# Patient Record
Sex: FEMALE | Race: WHITE | NOT HISPANIC OR LATINO | Employment: UNEMPLOYED | URBAN - METROPOLITAN AREA
[De-identification: names, ages, dates, MRNs, and addresses within clinical notes are randomized per-mention and may not be internally consistent; named-entity substitution may affect disease eponyms.]

---

## 2018-02-05 ENCOUNTER — HOSPITAL ENCOUNTER (OUTPATIENT)
Dept: PHYSICAL THERAPY | Facility: HOSPITAL | Age: 43
Setting detail: THERAPIES SERIES
Discharge: HOME | End: 2018-02-05

## 2018-02-05 DIAGNOSIS — M54.5 LOW BACK PAIN, UNSPECIFIED BACK PAIN LATERALITY, UNSPECIFIED CHRONICITY, WITH SCIATICA PRESENCE UNSPECIFIED: ICD-10-CM

## 2018-02-05 DIAGNOSIS — M54.2 CERVICALGIA: Primary | ICD-10-CM

## 2018-02-05 PROCEDURE — 97161 PT EVAL LOW COMPLEX 20 MIN: CPT | Performed by: PHYSICAL THERAPIST

## 2018-02-05 NOTE — THERAPY EVALUATION
Outpatient Physical Therapy Ortho Initial Evaluation   [place]     Patient Name: Helena Herrmann  : 1975  MRN: [id number]  Today's Date: 2018      Visit Date: 2018    There is no problem list on file for this patient.       Past Medical History:   Diagnosis Date   • Scoliosis    • Spinal stenosis         Past Surgical History:   Procedure Laterality Date   • CHOLECYSTECTOMY         Visit Dx:     ICD-10-CM ICD-9-CM   1. Cervicalgia M54.2 723.1   2. Low back pain, unspecified back pain laterality, unspecified chronicity, with sciatica presence unspecified M54.5 724.2             Patient History       18 0600          History    Chief Complaint Difficulty with daily activities;Pain  -GC      Type of Pain Back pain;Neck pain;Upper Extremity / Arm  -GC      Date Current Problem(s) Began --   Pt reports years of neck and back pain  -      Brief Description of Current Complaint Pt c/o years of neck and back pain. She has had previous therapy and has used a chiropractor as well. She states her pain is constant and she has experienced a recent increase inher back pain withporlonged standing at work and she states she has started to experience a cold sensation and pain in her UEs to her elbows. She was seen by JOHNNA Lopez who has referred her to therapy.  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 X-ray;MRI  -      Pain     Pain Location Back;Neck  -      Pain at Present 9  -GC      Pain at Best 9  -GC      Pain at Worst 9  -GC      Pain Frequency Constant/continuous  -GC      Pain Description Aching;Discomfort;Tightness;Radiating;Other (Comment)   cool sensation  -      What Performance Factors Make the Current Problem(s)  WORSE? Pt c/o back pain with being on her feet for long periods. She states that any activity that involves use of her arms or turningher head increase her neck pain  -GC      What Performance Factors Make the Current Problem(s) BETTER? Pt feels better if she is able to lie down and rest  -GC      Difficulties at work? Pt has difficulty being on her feet for long periods at work  -GC      Difficulties with ADL's? Pt has pain with light household chores  -GC      Daily Activities    Primary Language English  -GC      Are you able to read Yes  -GC      Are you able to write Yes  -GC      How does patient learn best? Reading;Other (comment)   visual  -GC      Teaching needs identified Home Exercise Program;Management of Condition  -GC      Patient is concerned about/has problems with Flexibility;Performing home management (household chores, shopping, care of dependents);Performing job responsibilities/community activities (work, school,;Performing sports, recreation, and play activities;Standing;Walking  -GC      Does patient have problems with the following? None  -GC      Barriers to learning None  -GC      Pt Participated in POC and Goals Yes  -GC      Safety    Are you being hurt, hit, or frightened by anyone at home or in your life? No  -GC      Are you being neglected by a caregiver No  -GC        User Key  (r) = Recorded By, (t) = Taken By, (c) = Cosigned By    Initials Name Provider Type    GC Best Greenfield PT Physical Therapist                PT Ortho       02/05/18 0600    Posture/Observations    Forward Head Mild  -GC    Cervical Lordosis Decreased  -GC    Thoracic Kyphosis Normal  -GC    Rounded Shoulders Bilateral:;Mild  -GC    Scapular Elevation Bilateral:;Normal  -GC    Scapular winging Bilateral:;Normal  -GC    Scoliosis Mild  -GC    Lumbar lordosis Decreased  -GC    Iliac crests Bilateral:;Normal  -GC    DTR- Upper Quarter Clearing    Biceps (C5/6) Bilateral:;2- Normal response  -GC    Brachioradialis  (C6) Bilateral:;2- Normal response  -GC    Triceps (C7) Bilateral:;2- Normal response  -GC    Sensory Screen for Light Touch- Upper Quarter Clearing    C4 (posterior shoulder) Bilateral:;Intact  -GC    C5 (lateral upper arm) Bilateral:;Intact  -GC    C6 (tip of thumb) Bilateral:;Intact  -GC    C7 (tip of 3rd finger) Bilateral:;Intact  -GC    C8 (tip of 5th finger) Bilateral:;Intact  -GC    T1 (medial lower arm) Bilateral:;Intact  -GC    Myotomal Screen- Upper Quarter Clearing    Shoulder flexion (C5) Bilateral:;5 (Normal)  -GC    Elbow flexion/wrist extension (C6) Bilateral:;5 (Normal)  -GC    Elbow extension/wrist flexion (C7) Bilateral:;5 (Normal)  -GC    Finger flexion/ (C8) Bilateral:;5 (Normal)  -GC    Finger abduction (T1) Bilateral:;5 (Normal)  -GC    DTR- Lower Quarter Clearing    Patellar tendon (L2-4) Bilateral:;2- Normal response  -GC    Achilles tendon (S1-2) Bilateral:;2- Normal response  -GC    Sensory Screen for Light Touch- Lower Quarter Clearing    L2 (anterior mid thigh) Bilateral:;Intact  -GC    L3 (distal anterior thigh) Bilateral:;Intact  -GC    L4 (medial lower leg/foot) Bilateral:;Intact  -GC    L5 (lateral lower leg/great toe) Bilateral:;Intact  -GC    S1 (bottom of foot) Bilateral:;Intact  -GC    Myotomal Screen- Lower Quarter Clearing    Hip flexion (L2) Bilateral:;5 (Normal)  -GC    Knee extension (L3) Bilateral:;5 (Normal)  -GC    Ankle DF (L4) Bilateral:;5 (Normal)  -GC    Great toe extension (L5) Bilateral:;5 (Normal)  -GC    Ankle PF (S1) Bilateral:;5 (Normal)  -GC    Knee flexion (S2) Bilateral:;5 (Normal)  -GC    Cervical Palpation    Levator Scapula Bilateral:;Tender;Guarded/taut  -GC    Upper Traps Bilateral:;Tender;Guarded/taut  -GC    Cervical/Thoracic Special Tests    Cervical Compression (Forarminal Compression vs. Facet Pain) Negative  -GC    Cervical Distraction (Foraminal Compression vs. Facet Pain) Negative  -GC    Sharp-Marianna (AA instability) Negative  -GC     Vertebral Artery Test (VBI Sign) Bilateral:;Negative  -GC    Lumbosacral Palpation    SI Right:;Tender   right on left posterior sacral torsion   -GC    Lumbosacral Segment Tender   FRS left at L5/S1  -GC    Erector Spinae (Paraspinals) Bilateral:;Tender;Guarded/taut  -GC    Lumbar/SI Special Tests    Stork Test (SI Dysfunction) Right:;Positive  -GC    SLR (Neural Tension) Bilateral:;Negative  -GC    ANOOP (hip vs. SI Dysfunction) Bilateral:;Positive  -GC    Neck    Flexion AROM Deficit 75% range  -GC    Extension AROM Deficit 50% range  -GC    Lt Lat Flexion AROM Deficit 75% range  -GC    Rt Lateral Flexion AROM Deficit 75% range  -GC    Lt Rotation AROM Deficit 75% range  -GC    Rt Rotation AROM Deficit 75% range  -GC    Trunk    Flexion AROM Deficit 50% range  -GC    Extension AROM Deficit 50% range wiht pain  -GC    Left Lateral Flexion AROM within functional limits  -GC    Right Lateral Flexion AROM Deficit 50% range with pain  -GC    Left Rotation AROM within functional limits  -GC    Right Rotation AROM Deficit 50% range with pain  -GC    Trunk    Trunk Flexion Gross Movement (4/5) good  -GC    Trunk Extension Gross Movement (4/5) good  -GC    Upper Extremity Flexibility    Upper Trapezius Bilateral:;Moderately limited  -GC    Levator Scapula Bilateral:;Moderately limited  -GC    Lower Extremity Flexibility    Hamstrings Bilateral:;Moderately limited  -GC    Hip External Rotators Bilateral:;Mildly limited  -GC    Hip Internal Rotators Bilateral:;Moderately limited  -GC    Transfers    Transfer, Comment Pt is independent with all bed mobility and transfers  -      User Key  (r) = Recorded By, (t) = Taken By, (c) = Cosigned By    Initials Name Provider Type    GC Best Greenfield PT Physical Therapist                      Therapy Education  Given: HEP, Symptoms/condition management, Pain management, Posture/body mechanics  Program: New  How Provided: Verbal, Demonstration, Written  Provided to: Patient  Level  of Understanding: Teach back education performed, Verbalized, Demonstrated           PT OP Goals       02/05/18 0600       PT Short Term Goals    STG Date to Achieve 02/19/18  -     STG 1 Decrease pain to 4-5/10 with activity.  -GC     STG 2 Increase cervical AROM to WFL all planes with testing.  -GC     STG 3 Increase trunk AROM to WFL all planes with testing.  -GC     STG 4 Increase hamstring and piriformis flexibility to only a minimal restricition with testing.  -GC     STG 5 Increase upper trapezius flexibility to only a minimal restriciton with testing.  -GC     STG 6 Pt will be independent with her HEP issued by this therapist.  -GC     Long Term Goals    LTG Date to Achieve 03/05/18  -GC     LTG 1 Decrease pain to 1-2/10 with activity.  -GC     LTG 2 Increase hamstring and piriformis flexibility to WFL with testing.  -GC     LTG 3 Increase upper trapezius flexibility to WFL with testing.  -     LTG 4 Pt will be independent with all ADLs without increased pain.  -     Time Calculation    PT Goal Re-Cert Due Date 03/05/18  -       User Key  (r) = Recorded By, (t) = Taken By, (c) = Cosigned By    Initials Name Provider Type    GC Best Greenfield, PT Physical Therapist                PT Assessment/Plan       02/05/18 0600       PT Assessment    Functional Limitations Limitations in community activities;Limitations in functional capacity and performance;Performance in work activities;Performance in leisure activities;Performance in self-care ADL  -     Impairments Range of motion;Pain;Muscle strength;Impaired flexibility  -     Assessment Comments Pt presents with a long history of neck and back pain and also c/o some raducular pain in her UEs. She rates her painas a 9/10 and that it is consistent no matter what position or activity she does. She has decreased cervcial and lumbar pain, decreased flexibility, decreased core strength, and decreased function secondary to the above.  -     Please refer to  paper survey for additional self-reported information Yes  -GC     Rehab Potential Fair  -GC     Patient/caregiver participated in establishment of treatment plan and goals Yes  -GC     Patient would benefit from skilled therapy intervention Yes  -GC     PT Plan    PT Frequency 2x/week  -GC     Predicted Duration of Therapy Intervention (days/wks) 4 weeks  -GC     Planned CPT's? PT EVAL LOW COMPLEXITY: 00862;PT THER PROC EA 15 MIN: 97121;PT MANUAL THERAPY EA 15 MIN: 04032;PT HOT OR COLD PACK TREAT MCARE;PT ELECTRICAL STIM UNATTEND: ;PT ULTRASOUND EA 15 MIN: 27945;PT TRACTION CERVICAL: 50319  -GC     PT Plan Comments Pt is to continue her HEP 2x daily  -GC       User Key  (r) = Recorded By, (t) = Taken By, (c) = Cosigned By    Initials Name Provider Type    MARTIN Greenfield, PT Physical Therapist                Modalities       02/05/18 0600          Traction 56025    Traction Type Cervical  -GC      Rx Minutes 15  -GC      Position Hook-lying  -GC      Weight 14  -GC      Hold 60  -GC      Relax 20  -GC        User Key  (r) = Recorded By, (t) = Taken By, (c) = Cosigned By    Initials Name Provider Type    MARTIN Greenfield, PT Physical Therapist              Exercises       02/05/18 0600          Exercise 1    Exercise Name 1 Hamstring stretch with ADD-bilateral  -GC      Cueing 1 Verbal;Tactile  -GC      Reps 1 10  -GC      Time (Seconds) 1 10 secsa  -GC      Exercise 2    Exercise Name 2 Piriformis stretch-bilateral  -GC      Cueing 2 Verbal;Tactile  -GC      Reps 2 10  -GC      Time (Seconds) 2 10 secs  -GC      Exercise 3    Exercise Name 3 MET for FRS left at L5/S1 and right on left posterior sacral torsion  -GC      Time (Minutes) 3 2 min  -GC      Exercise 4    Exercise Name 4 Unilateral press up on right   -GC      Cueing 4 Verbal;Tactile  -GC      Reps 4 20  -GC        User Key  (r) = Recorded By, (t) = Taken By, (c) = Cosigned By    Initials Name Provider Type    MARTIN Greenfield, PT Physical Therapist                                   Time Calculation:   Start Time: 0600  Stop Time: 0704  Time Calculation (min): 64 min     Therapy Charges for Today     Code Description Service Date Service Provider Modifiers Qty    91877355690 HC PT EVAL LOW COMPLEXITY 3 2/5/2018 Best Greenfield, PT GP 1                    Best Greenfield, PT  2/5/2018

## 2018-02-09 ENCOUNTER — HOSPITAL ENCOUNTER (OUTPATIENT)
Dept: PHYSICAL THERAPY | Facility: HOSPITAL | Age: 43
Setting detail: THERAPIES SERIES
Discharge: HOME | End: 2018-02-09

## 2018-02-09 DIAGNOSIS — M54.2 CERVICALGIA: Primary | ICD-10-CM

## 2018-02-09 DIAGNOSIS — M54.5 LOW BACK PAIN, UNSPECIFIED BACK PAIN LATERALITY, UNSPECIFIED CHRONICITY, WITH SCIATICA PRESENCE UNSPECIFIED: ICD-10-CM

## 2018-02-09 PROCEDURE — 97012 MECHANICAL TRACTION THERAPY: CPT | Performed by: PHYSICAL THERAPIST

## 2018-02-09 PROCEDURE — 97110 THERAPEUTIC EXERCISES: CPT | Performed by: PHYSICAL THERAPIST

## 2018-02-09 NOTE — THERAPY TREATMENT NOTE
Outpatient Physical Therapy Ortho Treatment Note  [facility]     Patient Name: Helena Herrmann  : 1975  MRN: [id number]  Today's Date: 2018      Visit Date: 2018    Visit Dx:    ICD-10-CM ICD-9-CM   1. Cervicalgia M54.2 723.1   2. Low back pain, unspecified back pain laterality, unspecified chronicity, with sciatica presence unspecified M54.5 724.2       There is no problem list on file for this patient.       Past Medical History:   Diagnosis Date   • Scoliosis    • Spinal stenosis         Past Surgical History:   Procedure Laterality Date   • CHOLECYSTECTOMY               PT Ortho       18 0540    Subjective Comments    Subjective Comments Pt states she was a little sore after the first visit, bu tnot to bad.  -GC      User Key  (r) = Recorded By, (t) = Taken By, (c) = Cosigned By    Initials Name Provider Type    MARTIN Greenfield, PT Physical Therapist                            PT Assessment/Plan       18 0540       PT Assessment    Assessment Comments Pt seems to be responding well to the therapy with decreasing c/o pain.  -GC     PT Plan    PT Plan Comments Pt is to continue her HEP 2x daily.  -GC       User Key  (r) = Recorded By, (t) = Taken By, (c) = Cosigned By    Initials Name Provider Type    MARTIN Greenfield, PT Physical Therapist                Modalities       18 0540          Traction 23443    Traction Type Cervical  -GC      Rx Minutes 15  -GC      Position Hook-lying  -GC      Weight 16  -GC      Hold 60  -GC      Relax 20  -GC        User Key  (r) = Recorded By, (t) = Taken By, (c) = Cosigned By    Initials Name Provider Type    MARTIN Greenfield, PT Physical Therapist                Exercises       18 0540          Subjective Comments    Subjective Comments Pt states she was a little sore after the first visit, bu tnot to bad.  -GC      Exercise 1    Exercise Name 1 Hamstring stretch with ADD-bilateral  -GC      Cueing 1 Verbal;Tactile  -GC      Reps  1 10  -GC      Time (Seconds) 1 10 secsa  -GC      Exercise 2    Exercise Name 2 Piriformis stretch-bilateral  -GC      Cueing 2 Verbal;Tactile  -GC      Reps 2 10  -GC      Time (Seconds) 2 10 secs  -GC      Exercise 3    Exercise Name 3 MET for FRS left at L5/S1 and right on left posterior sacral torsion  -GC      Time (Minutes) 3 2 min  -GC      Exercise 4    Exercise Name 4 Unilateral press up on right   -GC      Cueing 4 Verbal;Tactile  -GC      Reps 4 20  -GC      Exercise 5    Exercise Name 5 Prone hip extension  -GC      Cueing 5 Verbal  -GC      Reps 5 30  -GC      Exercise 6    Exercise Name 6 Prone chest raise  -GC      Cueing 6 Verbal  -GC      Reps 6 20  -GC        User Key  (r) = Recorded By, (t) = Taken By, (c) = Cosigned By    Initials Name Provider Type     Best Greenfield, PT Physical Therapist                                            Time Calculation:   Start Time: 0540  Stop Time: 0623  Time Calculation (min): 43 min    Therapy Charges for Today     Code Description Service Date Service Provider Modifiers Qty    86024157147  PT THER PROC EA 15 MIN 2/9/2018 Best Greenfield PT GP 1    [id number]  PT-TRACTION MECHANICAL 2/9/2018 Best Greenfield PT  1                    Best Greenfield PT  2/9/2018

## 2018-02-12 ENCOUNTER — HOSPITAL ENCOUNTER (OUTPATIENT)
Dept: PHYSICAL THERAPY | Facility: HOSPITAL | Age: 43
Setting detail: THERAPIES SERIES
Discharge: HOME | End: 2018-02-12

## 2018-02-12 DIAGNOSIS — M54.2 CERVICALGIA: Primary | ICD-10-CM

## 2018-02-12 DIAGNOSIS — M54.5 LOW BACK PAIN, UNSPECIFIED BACK PAIN LATERALITY, UNSPECIFIED CHRONICITY, WITH SCIATICA PRESENCE UNSPECIFIED: ICD-10-CM

## 2018-02-12 PROCEDURE — 97012 MECHANICAL TRACTION THERAPY: CPT | Performed by: PHYSICAL THERAPIST

## 2018-02-12 PROCEDURE — 97110 THERAPEUTIC EXERCISES: CPT | Performed by: PHYSICAL THERAPIST

## 2018-02-12 NOTE — THERAPY TREATMENT NOTE
Outpatient Physical Therapy [facility] Treatment Note  [facility] [place]     Patient Name: Helena Herrmann  : 1975  MRN: [id number]  Today's Date: 2018      Visit Date: 2018    Visit Dx:    ICD-10-CM ICD-9-CM   1. Cervicalgia M54.2 723.1   2. Low back pain, unspecified back pain laterality, unspecified chronicity, with sciatica presence unspecified M54.5 724.2       There is no problem list on file for this patient.       Past Medical History:   Diagnosis Date   • Scoliosis    • Spinal stenosis         Past Surgical History:   Procedure Laterality Date   • CHOLECYSTECTOMY               PT Ortho       18 0548    Subjective Comments    Subjective Comments Pt states her back is feeling better, but she has not seen any real change in her neck pain.  -GC      User Key  (r) = Recorded By, (t) = Taken By, (c) = Cosigned By    Initials Name Provider Type    MARTIN Greenfield, PT Physical Therapist                            PT Assessment/Plan       18 8869       PT Assessment    Assessment Comments Pt is doing well with decreased back pain. She tolerated increased traction pull well. This was done to help with her continued cervical pain.  -GC     PT Plan    PT Plan Comments Pt is to continue her HEP daily. Re-ck again 2018.  -GC       User Key  (r) = Recorded By, (t) = Taken By, (c) = Cosigned By    Initials Name Provider Type    MARTIN Greenfield, PT Physical Therapist                Modalities       18 2688          Traction 79876    Traction Type Cervical  -GC      Rx Minutes 15  -GC      Position Hook-lying  -GC      Weight 17  -GC      Hold 80  -GC      Relax 20  -GC        User Key  (r) = Recorded By, (t) = Taken By, (c) = Cosigned By    Initials Name Provider Type    MARTIN Greenfield, PT Physical Therapist                Exercises       18 0530          Subjective Comments    Subjective Comments Pt states her back is feeling better, but she has not seen any real change in her  neck pain.  -GC      Exercise 1    Exercise Name 1 Hamstring stretch with ADD-bilateral  -GC      Cueing 1 Verbal;Tactile  -GC      Reps 1 10  -GC      Time (Seconds) 1 10 secsa  -GC      Exercise 2    Exercise Name 2 Piriformis stretch-bilateral  -GC      Cueing 2 Verbal;Tactile  -GC      Reps 2 10  -GC      Time (Seconds) 2 10 secs  -GC      Exercise 3    Exercise Name 3 MET for FRS left at L5/S1 and right on left posterior sacral torsion  -GC      Time (Minutes) 3 2 min  -GC      Exercise 4    Exercise Name 4 Unilateral press up on right   -GC      Cueing 4 Verbal;Tactile  -GC      Reps 4 20  -GC      Exercise 5    Exercise Name 5 Prone hip extension  -GC      Cueing 5 Verbal  -GC      Reps 5 30  -GC      Exercise 6    Exercise Name 6 Prone chest raise  -GC      Cueing 6 Verbal  -GC      Reps 6 20  -GC      Exercise 7    Exercise Name 7 AP mobilizations L3, L4, L5  -GC      Time (Minutes) 7 3 min  -GC      Exercise 8    Exercise Name 8 Press ups with overpressure at L4, L5, S1  -GC      Reps 8 15  -GC        User Key  (r) = Recorded By, (t) = Taken By, (c) = Cosigned By    Initials Name Provider Type     Best Greenfield, PT Physical Therapist                                            Time Calculation:   Start Time: 0545  Stop Time: 0633  Time Calculation (min): 48 min    Therapy Charges for Today     Code Description Service Date Service Provider Modifiers Qty    05919214882  PT THER PROC EA 15 MIN 2/12/2018 Best Greenfield, PT GP 1    [id number]  PT-TRACTION MECHANICAL 2/12/2018 Best Greenfield PT  1                    Best Greenfield PT  2/12/2018

## 2018-02-16 ENCOUNTER — HOSPITAL ENCOUNTER (OUTPATIENT)
Dept: PHYSICAL THERAPY | Facility: HOSPITAL | Age: 43
Setting detail: THERAPIES SERIES
Discharge: HOME | End: 2018-02-16

## 2018-02-16 DIAGNOSIS — M54.5 LOW BACK PAIN, UNSPECIFIED BACK PAIN LATERALITY, UNSPECIFIED CHRONICITY, WITH SCIATICA PRESENCE UNSPECIFIED: ICD-10-CM

## 2018-02-16 DIAGNOSIS — M54.2 CERVICALGIA: Primary | ICD-10-CM

## 2018-02-16 PROCEDURE — 97012 MECHANICAL TRACTION THERAPY: CPT | Performed by: PHYSICAL THERAPIST

## 2018-02-16 PROCEDURE — 97110 THERAPEUTIC EXERCISES: CPT | Performed by: PHYSICAL THERAPIST

## 2018-02-16 NOTE — THERAPY TREATMENT NOTE
Outpatient Physical Therapy Ortho Treatment Note  [facility]     Patient Name: Helena Herrmann  : 1975  MRN: [id number]  Today's Date: 2018      Visit Date: 2018    Visit Dx:    ICD-10-CM ICD-9-CM   1. Cervicalgia M54.2 723.1   2. Low back pain, unspecified back pain laterality, unspecified chronicity, with sciatica presence unspecified M54.5 724.2       There is no problem list on file for this patient.       Past Medical History:   Diagnosis Date   • Scoliosis    • Spinal stenosis         Past Surgical History:   Procedure Laterality Date   • CHOLECYSTECTOMY               PT Ortho       18 0545    Subjective Comments    Subjective Comments Pt states her back is more sore today and she feels like she is back to square one. Her neck did get a little sore the day of the last session, but is better today.  -GC      User Key  (r) = Recorded By, (t) = Taken By, (c) = Cosigned By    Initials Name Provider Type    MARTIN Greenfield, PT Physical Therapist                            PT Assessment/Plan       18 0545       PT Assessment    Assessment Comments Pt seemed to tolerate the treatment well today without the press ups wiht overpressure.  -GC     PT Plan    PT Plan Comments Pt is to continue her HEP daily.  -GC       User Key  (r) = Recorded By, (t) = Taken By, (c) = Cosigned By    Initials Name Provider Type    MARTIN Greenfield, PT Physical Therapist                Modalities       18 0560          Traction 42748    Traction Type Cervical  -GC      Rx Minutes 15  -GC      Position Hook-lying  -GC      Weight 17  -GC      Hold 80  -GC      Relax 20  -GC        User Key  (r) = Recorded By, (t) = Taken By, (c) = Cosigned By    Initials Name Provider Type    MARTIN Greenfield, PT Physical Therapist                Exercises       18 0545          Subjective Comments    Subjective Comments Pt states her back is more sore today and she feels like she is back to square one. Her  neck did get a little sore the day of the last session, but is better today.  -GC      Exercise 1    Exercise Name 1 Hamstring stretch with ADD-bilateral  -GC      Cueing 1 Verbal;Tactile  -GC      Reps 1 10  -GC      Time (Seconds) 1 10 secsa  -GC      Exercise 2    Exercise Name 2 Piriformis stretch-bilateral  -GC      Cueing 2 Verbal;Tactile  -GC      Reps 2 10  -GC      Time (Seconds) 2 10 secs  -GC      Exercise 3    Exercise Name 3 MET for FRS left at L5/S1 and right on left posterior sacral torsion  -GC      Time (Minutes) 3 2 min  -GC      Exercise 4    Exercise Name 4 Unilateral press up on right   -GC      Cueing 4 Verbal;Tactile  -GC      Reps 4 20  -GC      Exercise 5    Exercise Name 5 Prone hip extension  -GC      Cueing 5 Verbal  -GC      Reps 5 30  -GC      Exercise 6    Exercise Name 6 Prone chest raise  -GC      Cueing 6 Verbal  -GC      Reps 6 20  -GC      Exercise 7    Exercise Name 7 AP mobilizations L3, L4, L5  -GC      Time (Minutes) 7 3 min  -GC      Exercise 8    Exercise Name 8 Press ups with overpressure at L4, L5, S1  -GC      Reps 8 --  -GC        User Key  (r) = Recorded By, (t) = Taken By, (c) = Cosigned By    Initials Name Provider Type     Best Greenfield PT Physical Therapist                                            Time Calculation:   Start Time: 0545  Stop Time: 0628  Time Calculation (min): 43 min    Therapy Charges for Today     Code Description Service Date Service Provider Modifiers Qty    38096160105  PT THER PROC EA 15 MIN 2/16/2018 Best Greenfield, PT GP 1    [id number]  PT-TRACTION MECHANICAL 2/16/2018 Best Greenfield PT  1                    Best Greenfield PT  2/16/2018

## 2018-02-19 ENCOUNTER — HOSPITAL ENCOUNTER (OUTPATIENT)
Dept: PHYSICAL THERAPY | Facility: HOSPITAL | Age: 43
Setting detail: THERAPIES SERIES
Discharge: HOME | End: 2018-02-19

## 2018-02-19 DIAGNOSIS — M54.2 CERVICALGIA: Primary | ICD-10-CM

## 2018-02-19 DIAGNOSIS — M54.5 LOW BACK PAIN, UNSPECIFIED BACK PAIN LATERALITY, UNSPECIFIED CHRONICITY, WITH SCIATICA PRESENCE UNSPECIFIED: ICD-10-CM

## 2018-02-19 PROCEDURE — 97110 THERAPEUTIC EXERCISES: CPT | Performed by: PHYSICAL THERAPIST

## 2018-02-19 PROCEDURE — 97012 MECHANICAL TRACTION THERAPY: CPT | Performed by: PHYSICAL THERAPIST

## 2018-02-19 NOTE — THERAPY TREATMENT NOTE
Outpatient Physical Therapy Ortho Treatment Note  [facility]     Patient Name: Helena Herrmann  : 1975  MRN: [id number]  Today's Date: 2018      Visit Date: 2018    Visit Dx:    ICD-10-CM ICD-9-CM   1. Cervicalgia M54.2 723.1   2. Low back pain, unspecified back pain laterality, unspecified chronicity, with sciatica presence unspecified M54.5 724.2       There is no problem list on file for this patient.       Past Medical History:   Diagnosis Date   • Scoliosis    • Spinal stenosis         Past Surgical History:   Procedure Laterality Date   • CHOLECYSTECTOMY               PT Ortho       18 0540    Subjective Comments    Subjective Comments Pt states her back feels about the same.  -GC      User Key  (r) = Recorded By, (t) = Taken By, (c) = Cosigned By    Initials Name Provider Type    MARTIN Greenfield, PT Physical Therapist                            PT Assessment/Plan       18 0540       PT Assessment    Assessment Comments Pt is doing well with decreasing cervical pain. Her LBP is still a limiting factor to her ADL function.  -     PT Plan    PT Plan Comments Pt is to continue her HEP daily.  -GC       User Key  (r) = Recorded By, (t) = Taken By, (c) = Cosigned By    Initials Name Provider Type    MARTIN Greenfield, PT Physical Therapist                Modalities       18 0540          Traction [id number]    Traction Type Cervical  -GC      Rx Minutes 20  -GC      Position Hook-lying  -GC      Weight 17  -GC      Hold 80  -GC      Relax 20  -GC        User Key  (r) = Recorded By, (t) = Taken By, (c) = Cosigned By    Initials Name Provider Type    MARTIN Greenfield, PT Physical Therapist                Exercises       18 0540          Subjective Comments    Subjective Comments Pt states her back feels about the same.  -GC      Exercise 1    Exercise Name 1 Hamstring stretch with ADD-bilateral  -GC      Cueing 1 Verbal;Tactile  -GC      Reps 1 10  -GC      Time (Seconds)  1 10 secsa  -GC      Exercise 2    Exercise Name 2 Piriformis stretch-bilateral  -GC      Cueing 2 Verbal;Tactile  -GC      Reps 2 10  -GC      Time (Seconds) 2 10 secs  -GC      Exercise 3    Exercise Name 3 MET for FRS left at L5/S1 and right on left posterior sacral torsion  -GC      Time (Minutes) 3 2 min  -GC      Exercise 4    Exercise Name 4 Unilateral press up on right   -GC      Cueing 4 Verbal;Tactile  -GC      Reps 4 20  -GC      Exercise 5    Exercise Name 5 Prone hip extension  -GC      Cueing 5 Verbal  -GC      Reps 5 30  -GC      Exercise 6    Exercise Name 6 Prone chest raise  -GC      Cueing 6 Verbal  -GC      Reps 6 20  -GC      Exercise 7    Exercise Name 7 AP mobilizations L3, L4, L5  -GC      Time (Minutes) 7 3 min  -GC      Exercise 8    Exercise Name 8 Press ups with overpressure at L4, L5, S1  -GC        User Key  (r) = Recorded By, (t) = Taken By, (c) = Cosigned By    Initials Name Provider Type     Best Greenfield, PT Physical Therapist                                            Time Calculation:   Start Time: 0540  Stop Time: 0627  Time Calculation (min): 47 min    Therapy Charges for Today     Code Description Service Date Service Provider Modifiers Qty    [id number]  PT THER PROC EA 15 MIN 2/19/2018 Best Greenfield, PT GP 1    [id number]  PT-TRACTION MECHANICAL 2/19/2018 Best Greenfield PT  1                    Best Greenfield, PT  2/19/2018

## 2018-02-23 ENCOUNTER — APPOINTMENT (OUTPATIENT)
Dept: PHYSICAL THERAPY | Facility: HOSPITAL | Age: 43
End: 2018-02-23

## 2018-04-16 ENCOUNTER — APPOINTMENT (OUTPATIENT)
Dept: CT IMAGING | Facility: HOSPITAL | Age: 43
End: 2018-04-16

## 2018-04-16 ENCOUNTER — HOSPITAL ENCOUNTER (EMERGENCY)
Facility: HOSPITAL | Age: 43
Discharge: ANOTHER HOSPITAL | End: 2018-04-16
Attending: EMERGENCY MEDICINE | Admitting: EMERGENCY MEDICINE

## 2018-04-16 ENCOUNTER — APPOINTMENT (OUTPATIENT)
Dept: GENERAL RADIOLOGY | Facility: HOSPITAL | Age: 43
End: 2018-04-16

## 2018-04-16 VITALS
BODY MASS INDEX: 31.71 KG/M2 | HEART RATE: 72 BPM | DIASTOLIC BLOOD PRESSURE: 59 MMHG | TEMPERATURE: 98.7 F | RESPIRATION RATE: 18 BRPM | SYSTOLIC BLOOD PRESSURE: 106 MMHG | HEIGHT: 63 IN | WEIGHT: 179 LBS | OXYGEN SATURATION: 96 %

## 2018-04-16 DIAGNOSIS — S32.010A CLOSED COMPRESSION FRACTURE OF FIRST LUMBAR VERTEBRA, INITIAL ENCOUNTER: Primary | ICD-10-CM

## 2018-04-16 DIAGNOSIS — S52.571A OTHER CLOSED INTRA-ARTICULAR FRACTURE OF DISTAL END OF RIGHT RADIUS, INITIAL ENCOUNTER: ICD-10-CM

## 2018-04-16 PROCEDURE — 73090 X-RAY EXAM OF FOREARM: CPT

## 2018-04-16 PROCEDURE — 99284 EMERGENCY DEPT VISIT MOD MDM: CPT

## 2018-04-16 PROCEDURE — 25010000002 FENTANYL CITRATE (PF) 100 MCG/2ML SOLUTION: Performed by: EMERGENCY MEDICINE

## 2018-04-16 PROCEDURE — 72100 X-RAY EXAM L-S SPINE 2/3 VWS: CPT

## 2018-04-16 PROCEDURE — 29105 APPLICATION LONG ARM SPLINT: CPT | Performed by: EMERGENCY MEDICINE

## 2018-04-16 PROCEDURE — 96372 THER/PROPH/DIAG INJ SC/IM: CPT

## 2018-04-16 PROCEDURE — 25010000002 PROMETHAZINE PER 50 MG: Performed by: EMERGENCY MEDICINE

## 2018-04-16 PROCEDURE — 99284 EMERGENCY DEPT VISIT MOD MDM: CPT | Performed by: EMERGENCY MEDICINE

## 2018-04-16 PROCEDURE — 72131 CT LUMBAR SPINE W/O DYE: CPT

## 2018-04-16 PROCEDURE — 25010000002 HYDROMORPHONE PER 4 MG: Performed by: EMERGENCY MEDICINE

## 2018-04-16 RX ORDER — HYDROCODONE BITARTRATE AND ACETAMINOPHEN 5; 325 MG/1; MG/1
1 TABLET ORAL 3 TIMES DAILY
COMMUNITY
End: 2021-05-30

## 2018-04-16 RX ORDER — PROMETHAZINE HYDROCHLORIDE 25 MG/ML
25 INJECTION, SOLUTION INTRAMUSCULAR; INTRAVENOUS ONCE
Status: COMPLETED | OUTPATIENT
Start: 2018-04-16 | End: 2018-04-16

## 2018-04-16 RX ORDER — HYDROMORPHONE HCL 110MG/55ML
2 PATIENT CONTROLLED ANALGESIA SYRINGE INTRAVENOUS ONCE
Status: COMPLETED | OUTPATIENT
Start: 2018-04-16 | End: 2018-04-16

## 2018-04-16 RX ORDER — FENTANYL CITRATE 50 UG/ML
100 INJECTION, SOLUTION INTRAMUSCULAR; INTRAVENOUS ONCE
Status: COMPLETED | OUTPATIENT
Start: 2018-04-16 | End: 2018-04-16

## 2018-04-16 RX ORDER — TOPIRAMATE 100 MG/1
100 TABLET, FILM COATED ORAL 2 TIMES DAILY
COMMUNITY
End: 2021-05-30

## 2018-04-16 RX ADMIN — HYDROMORPHONE HYDROCHLORIDE 2 MG: 2 INJECTION INTRAMUSCULAR; INTRAVENOUS; SUBCUTANEOUS at 19:42

## 2018-04-16 RX ADMIN — FENTANYL CITRATE 100 MCG: 50 INJECTION INTRAMUSCULAR; INTRAVENOUS at 18:46

## 2018-04-16 RX ADMIN — PROMETHAZINE HYDROCHLORIDE 25 MG: 25 INJECTION INTRAMUSCULAR; INTRAVENOUS at 19:43
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
## 2020-08-10 ENCOUNTER — APPOINTMENT (OUTPATIENT)
Dept: PHYSICAL THERAPY | Facility: HOSPITAL | Age: 45
End: 2020-08-10

## 2020-09-17 ENCOUNTER — HOSPITAL ENCOUNTER (OUTPATIENT)
Dept: PHYSICAL THERAPY | Facility: HOSPITAL | Age: 45
Setting detail: THERAPIES SERIES
Discharge: HOME | End: 2020-09-17

## 2020-09-17 DIAGNOSIS — M54.2 NECK PAIN: Primary | ICD-10-CM

## 2020-09-17 DIAGNOSIS — G89.29 CHRONIC BILATERAL LOW BACK PAIN WITHOUT SCIATICA: ICD-10-CM

## 2020-09-17 DIAGNOSIS — M54.50 CHRONIC BILATERAL LOW BACK PAIN WITHOUT SCIATICA: ICD-10-CM

## 2020-09-17 PROCEDURE — 97161 PT EVAL LOW COMPLEX 20 MIN: CPT
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
## 2020-09-22 ENCOUNTER — HOSPITAL ENCOUNTER (OUTPATIENT)
Dept: PHYSICAL THERAPY | Facility: HOSPITAL | Age: 45
Setting detail: THERAPIES SERIES
Discharge: HOME | End: 2020-09-22

## 2020-09-22 DIAGNOSIS — M54.50 CHRONIC BILATERAL LOW BACK PAIN WITHOUT SCIATICA: ICD-10-CM

## 2020-09-22 DIAGNOSIS — M54.2 NECK PAIN: Primary | ICD-10-CM

## 2020-09-22 DIAGNOSIS — G89.29 CHRONIC BILATERAL LOW BACK PAIN WITHOUT SCIATICA: ICD-10-CM

## 2020-09-22 PROCEDURE — 97012 MECHANICAL TRACTION THERAPY: CPT

## 2020-09-22 PROCEDURE — 97110 THERAPEUTIC EXERCISES: CPT

## 2020-09-22 NOTE — THERAPY TREATMENT NOTE
"    Outpatient Physical Therapy Ortho Treatment Note  [facility]     Patient Name: Helena Herrmann  : 1975  MRN: [id number]  Today's Date: 2020      Visit Date: 2020    Visit Dx:    ICD-10-CM ICD-9-CM   1. Neck pain  M54.2 723.1   2. Chronic bilateral low back pain without sciatica  M54.5 724.2    G89.29 338.29       There is no problem list on file for this patient.       Past Medical History:   Diagnosis Date   • Scoliosis    • Spinal stenosis         Past Surgical History:   Procedure Laterality Date   • CHOLECYSTECTOMY                         PT Assessment/Plan     Row Name 20          PT Assessment    Assessment Comments  Progressed patient with trunk strengthening today as well as progressed her with cervical traction. She tolerated her treatment well today.  -AS        PT Plan    PT Plan Comments  Continue with current treatment plan.  -AS       User Key  (r) = Recorded By, (t) = Taken By, (c) = Cosigned By    Initials Name Provider Type    AS Evangelista Hutchinson, PT Physical Therapist          Modalities     Row Name 20             Moist Heat    MH Applied  Yes  -AS      Location  Lumbar/Cervical  -AS      Rx Minutes  10 mins  -AS         Traction 44987    Traction Type  Cervical  -AS      Rx Minutes  15  -AS      Duration  Intermittent  -AS      Position  Hook-lying  -AS      Weight  15  -AS      Hold  60  -AS      Relax  10  -AS        User Key  (r) = Recorded By, (t) = Taken By, (c) = Cosigned By    Initials Name Provider Type    AS Evangelista Hutchinson, PT Physical Therapist        OP Exercises     Row Name 20             Subjective Comments    Subjective Comments  Patient states she had \"a little bit of soreness\" after her first treatment session but states overall she feels about the same as she did before.  -AS         Exercise 1    Exercise Name 1  Hamstring stretch with ADD-bilateral  -AS      Reps 1  10  -AS      Time 1  10 sec hold each  -AS "      Reps 1  10  -AS         Exercise 2    Exercise Name 2  Piriformis stretch-bilateral  -AS      Reps 2  10  -AS      Time 2  10 sec hold each  -AS      Reps 2  10  -AS         Exercise 3    Exercise Name 3  Supine Clamshells  -AS      Reps 3  25  -AS      Time 3  Black  -AS         Exercise 4    Exercise Name 4  Bridge vs Band  -AS      Reps 4  25  -AS      Time 4  Black  -AS      Reps 4  20  -AS         Exercise 5    Exercise Name 5  PPT  -AS      Reps 5  25  -AS      Time 5  5 sec hold each  -AS      Reps 5  30  -AS         Exercise 6    Exercise Name 6  PPT with Ball Squeeze  -AS      Reps 6  25  -AS      Time 6  5 sec hold each  -AS      Reps 6  20  -AS         Exercise 7    Exercise Name 7  PPT with Clamshell  -AS         Exercise 8    Exercise Name 8  AP mobilizations L3, L4, L5  -AS      Time 8  3 min  -AS         Exercise 11    Exercise Name 11  K[place]. UT/ Levator Stretch  -AS      Reps 11  10  -AS      Time 11  10 sec hold each  -AS        User Key  (r) = Recorded By, (t) = Taken By, (c) = Cosigned By    Initials Name Provider Type    AS Evangelista Hutchinson, PT Physical Therapist                                          Time Calculation:   Start Time: 0857  Stop Time: 1000  Time Calculation (min): 63 min  Therapy Charges for Today     Code Description Service Date Service Provider Modifiers Qty    65077837900  PT THER PROC EA 15 MIN 9/22/2020 Evangelista Hutchinson, PT GP 3    [id number]  PT TRACTION LUMBAR 9/22/2020 Evangelista Hutchinson, PT GP 1                    Evangelista Hutchinson, PT  9/22/2020

## 2020-09-24 ENCOUNTER — HOSPITAL ENCOUNTER (OUTPATIENT)
Dept: PHYSICAL THERAPY | Facility: HOSPITAL | Age: 45
Setting detail: THERAPIES SERIES
Discharge: HOME | End: 2020-09-24

## 2020-09-24 DIAGNOSIS — M54.2 NECK PAIN: Primary | ICD-10-CM

## 2020-09-24 PROCEDURE — 97110 THERAPEUTIC EXERCISES: CPT

## 2020-09-24 PROCEDURE — 97012 MECHANICAL TRACTION THERAPY: CPT

## 2020-09-29 ENCOUNTER — HOSPITAL ENCOUNTER (OUTPATIENT)
Dept: PHYSICAL THERAPY | Facility: HOSPITAL | Age: 45
Setting detail: THERAPIES SERIES
Discharge: HOME | End: 2020-09-29

## 2020-09-29 DIAGNOSIS — G89.29 CHRONIC BILATERAL LOW BACK PAIN WITHOUT SCIATICA: ICD-10-CM

## 2020-09-29 DIAGNOSIS — M54.2 NECK PAIN: Primary | ICD-10-CM

## 2020-09-29 DIAGNOSIS — M54.50 CHRONIC BILATERAL LOW BACK PAIN WITHOUT SCIATICA: ICD-10-CM

## 2020-09-29 PROCEDURE — 97012 MECHANICAL TRACTION THERAPY: CPT

## 2020-09-29 PROCEDURE — 97110 THERAPEUTIC EXERCISES: CPT

## 2020-09-29 NOTE — THERAPY TREATMENT NOTE
Outpatient Physical Therapy Ortho Treatment Note  [facility]     Patient Name: Helena Herrmann  : 1975  MRN: [id number]  Today's Date: 2020      Visit Date: 2020    Visit Dx:    ICD-10-CM ICD-9-CM   1. Neck pain  M54.2 723.1   2. Chronic bilateral low back pain without sciatica  M54.5 724.2    G89.29 338.29       There is no problem list on file for this patient.       Past Medical History:   Diagnosis Date   • Scoliosis    • Spinal stenosis         Past Surgical History:   Procedure Laterality Date   • CHOLECYSTECTOMY                         PT Assessment/Plan     Row Name 20 0803          PT Assessment    Assessment Comments  Incorporated addition lumbar strengthening and increased pull on cervical traction. Pt tolerated well without reports of increased symptoms.   -KM        PT Plan    PT Plan Comments  Assess pts response to progressions.   -KM       User Key  (r) = Recorded By, (t) = Taken By, (c) = Cosigned By    Initials Name Provider Type    Taylor Oliva PTA Physical Therapy Assistant          Modalities     Row Name 20 0855             Subjective Comments    Subjective Comments  Pt states she continues to feel the same and has not noticed much improvement with current exercises.   -KM         Moist Heat    MH Applied  Yes  -KM      Location  Lumbar/Cervical  -KM      Rx Minutes  10 mins  -KM         Traction 52889    Traction Type  Cervical  -KM      Rx Minutes  15  -KM      Position  Hook-lying  -KM      Weight  17  -KM      Hold  60  -KM      Relax  10  -KM        User Key  (r) = Recorded By, (t) = Taken By, (c) = Cosigned By    Initials Name Provider Type    Taylor Oliva PTA Physical Therapy Assistant        OP Exercises     Row Name 20 0855             Subjective Comments    Subjective Comments  Pt states she continues to feel the same and has not noticed much improvement with current exercises.   -KM         Exercise 1    Exercise Name 1   Hamstring stretch with ADD-bilateral  -KM      Reps 1  10  -KM      Time 1  10 sec hold each  -KM      Reps 1  10  -KM         Exercise 2    Exercise Name 2  Piriformis stretch-bilateral  -KM      Reps 2  10  -KM      Time 2  10 sec hold each  -KM      Reps 2  10  -KM         Exercise 3    Exercise Name 3  Supine Clamshells  -KM      Reps 3  25  -KM      Time 3  Black  -KM         Exercise 4    Exercise Name 4  Bridge vs Band  -KM      Reps 4  25  -KM      Time 4  Black  -KM      Reps 4  20  -KM         Exercise 5    Exercise Name 5  PPT  -KM      Reps 5  25  -KM      Time 5  5 sec hold each  -KM      Reps 5  30  -KM         Exercise 6    Exercise Name 6  PPT with Ball Squeeze  -KM      Reps 6  25  -KM      Time 6  5 sec hold each  -KM      Reps 6  20  -KM         Exercise 7    Exercise Name 7  PPT with Clamshell  -KM         Exercise 8    Exercise Name 8  AP mobilizations L3, L4, L5  -KM      Time 8  3 min  -KM         Exercise 9    Exercise Name 9  Prone Trunk Extension  -KM      Reps 9  25  -KM         Exercise 10    Exercise Name 10  Prone Hip Extension  -KM      Reps 10  25  -KM         Exercise 11    Exercise Name 11  K[place]. UT/ Levator Stretch  -KM      Reps 11  10  -KM      Time 11  10 sec hold each  -KM        User Key  (r) = Recorded By, (t) = Taken By, (c) = Cosigned By    Initials Name Provider Type    Taylor Oliva PTA Physical Therapy Assistant                                          Time Calculation:   Start Time: 0855  Stop Time: 1000  Time Calculation (min): 65 min  Therapy Charges for Today     Code Description Service Date Service Provider Modifiers Qty    06855818847 HC PT THER PROC EA 15 MIN 9/29/2020 Taylor Albright PTA GP 1    61554454768 HC PT TRACTION CERVICAL 9/29/2020 Taylor Albright PTA GP 1                    Taylor Albright PTA  9/29/2020

## 2020-10-01 ENCOUNTER — HOSPITAL ENCOUNTER (OUTPATIENT)
Dept: PHYSICAL THERAPY | Facility: HOSPITAL | Age: 45
Setting detail: THERAPIES SERIES
Discharge: HOME | End: 2020-10-01

## 2020-10-01 DIAGNOSIS — M54.50 CHRONIC BILATERAL LOW BACK PAIN WITHOUT SCIATICA: ICD-10-CM

## 2020-10-01 DIAGNOSIS — G89.29 CHRONIC BILATERAL LOW BACK PAIN WITHOUT SCIATICA: ICD-10-CM

## 2020-10-01 DIAGNOSIS — M54.2 NECK PAIN: Primary | ICD-10-CM

## 2020-10-01 PROCEDURE — 97110 THERAPEUTIC EXERCISES: CPT

## 2020-10-01 PROCEDURE — 97012 MECHANICAL TRACTION THERAPY: CPT

## 2020-10-01 NOTE — THERAPY TREATMENT NOTE
"    Outpatient Physical Therapy Ortho Treatment Note  [facility]     Patient Name: Helena Herrmann  : 1975  MRN: [id number]  Today's Date: 10/1/2020      Visit Date: 10/01/2020    Visit Dx:    ICD-10-CM ICD-9-CM   1. Neck pain  M54.2 723.1   2. Chronic bilateral low back pain without sciatica  M54.5 724.2    G89.29 338.29       There is no problem list on file for this patient.       Past Medical History:   Diagnosis Date   • Scoliosis    • Spinal stenosis         Past Surgical History:   Procedure Laterality Date   • CHOLECYSTECTOMY                         PT Assessment/Plan     Row Name 10/01/20 09          PT Assessment    Assessment Comments  Progressed patient with cervical traction as well as trunk strengthening exercises today without complaints.  -AS        PT Plan    PT Plan Comments  Continue with current treatment plan.  -AS       User Key  (r) = Recorded By, (t) = Taken By, (c) = Cosigned By    Initials Name Provider Type    AS Evangelista Huthcinson, PT Physical Therapist          Modalities     Row Name 10/01/20 09             Moist Heat    MH Applied  Yes  -AS      Location  Lumbar/Cervical  -AS      Rx Minutes  10 mins  -AS         Traction 39059    Traction Type  Cervical  -AS      Rx Minutes  20  -AS      Duration  Intermittent  -AS      Position  Hook-lying  -AS      Weight  18  -AS      Hold  60  -AS      Relax  10  -AS        User Key  (r) = Recorded By, (t) = Taken By, (c) = Cosigned By    Initials Name Provider Type    AS Evangelista Hutchinson, PT Physical Therapist        OP Exercises     Row Name 10/01/20 09             Subjective Comments    Subjective Comments  Patient reports she is sore this morning and feels \"like my bones hurt\".  -AS         Exercise 1    Exercise Name 1  Hamstring stretch with ADD-bilateral  -AS      Reps 1  10  -AS      Time 1  10 sec hold each  -AS      Reps 1  10  -AS         Exercise 2    Exercise Name 2  Piriformis stretch-bilateral  -AS      Reps " 2  10  -AS      Time 2  10 sec hold each  -AS      Reps 2  10  -AS         Exercise 3    Exercise Name 3  Supine Clamshells  -AS      Reps 3  25  -AS      Time 3  Black  -AS         Exercise 4    Exercise Name 4  Bridge vs Band  -AS      Reps 4  25  -AS      Time 4  Black  -AS      Reps 4  20  -AS         Exercise 5    Exercise Name 5  PPT  -AS      Reps 5  25  -AS      Time 5  5 sec hold each  -AS      Reps 5  30  -AS         Exercise 6    Exercise Name 6  PPT with Ball Squeeze  -AS      Reps 6  25  -AS      Time 6  5 sec hold each  -AS      Reps 6  20  -AS         Exercise 7    Exercise Name 7  PPT with Clamshell  -AS         Exercise 8    Exercise Name 8  AP mobilizations L3, L4, L5  -AS      Time 8  3 min  -AS         Exercise 9    Exercise Name 9  Prone Trunk Extension  -AS      Reps 9  25  -AS         Exercise 10    Exercise Name 10  Prone Hip Extension  -AS      Reps 10  25  -AS         Exercise 11    Exercise Name 11  [place]. UT/ Levator Stretch  -AS      Reps 11  10  -AS      Time 11  10 sec hold each  -AS         Exercise 12    Exercise Name 12  Quadruped Alt. UE/LE  -AS      Reps 12  15 each side  -AS        User Key  (r) = Recorded By, (t) = Taken By, (c) = Cosigned By    Initials Name Provider Type    AS Evangelisat Hutchinson, PT Physical Therapist                                          Time Calculation:   Start Time: 0900  Stop Time: 1010  Time Calculation (min): 70 min  Therapy Charges for Today     Code Description Service Date Service Provider Modifiers Qty    31780108073 HC PT TRACTION CERVICAL 10/1/2020 Evangelista Hutchinson, PT GP 1    [id number] HC PT THER PROC EA 15 MIN 10/1/2020 Evangelista Hutchinson, PT GP 1                    Evangelista Hutchinson, PT  10/1/2020

## 2020-10-06 ENCOUNTER — HOSPITAL ENCOUNTER (OUTPATIENT)
Dept: PHYSICAL THERAPY | Facility: HOSPITAL | Age: 45
Setting detail: THERAPIES SERIES
Discharge: HOME | End: 2020-10-06

## 2020-10-06 DIAGNOSIS — M54.2 NECK PAIN: Primary | ICD-10-CM

## 2020-10-06 DIAGNOSIS — M54.50 CHRONIC BILATERAL LOW BACK PAIN WITHOUT SCIATICA: ICD-10-CM

## 2020-10-06 DIAGNOSIS — G89.29 CHRONIC BILATERAL LOW BACK PAIN WITHOUT SCIATICA: ICD-10-CM

## 2020-10-06 PROCEDURE — 97110 THERAPEUTIC EXERCISES: CPT

## 2020-10-06 PROCEDURE — 97012 MECHANICAL TRACTION THERAPY: CPT

## 2020-10-06 NOTE — THERAPY TREATMENT NOTE
Outpatient Physical Therapy Ortho Treatment Note  [facility]     Patient Name: Helena Herrmann  : 1975  MRN: [id number]  Today's Date: 10/6/2020      Visit Date: 10/06/2020    Visit Dx:    ICD-10-CM ICD-9-CM   1. Neck pain  M54.2 723.1   2. Chronic bilateral low back pain without sciatica  M54.5 724.2    G89.29 338.29       There is no problem list on file for this patient.       Past Medical History:   Diagnosis Date   • Scoliosis    • Spinal stenosis         Past Surgical History:   Procedure Laterality Date   • CHOLECYSTECTOMY                         PT Assessment/Plan     Row Name 10/06/20 0800          PT Assessment    Assessment Comments  Patient continues to report low back pain and neck pain that has unchanged with conservative treatment to this point.  -AS        PT Plan    PT Plan Comments  Continue with current treatment plan.  -AS       User Key  (r) = Recorded By, (t) = Taken By, (c) = Cosigned By    Initials Name Provider Type    AS Evangelista Hutchinson, PT Physical Therapist          Modalities     Row Name 10/06/20 0800             Moist Heat    MH Applied  Yes  -AS      Location  Lumbar/Cervical  -AS      Rx Minutes  10 mins  -AS         Traction 57270    Traction Type  Cervical  -AS      Rx Minutes  20  -AS      Position  Hook-lying  -AS      Weight  18  -AS      Hold  60  -AS      Relax  10  -AS        User Key  (r) = Recorded By, (t) = Taken By, (c) = Cosigned By    Initials Name Provider Type    AS Evangelista Hutchinson, PT Physical Therapist        OP Exercises     Row Name 10/06/20 0800             Subjective Comments    Subjective Comments  Patient states she is feeling about silvia same this morning.  -AS         Exercise 1    Exercise Name 1  Hamstring stretch with ADD-bilateral  -AS      Reps 1  10  -AS      Time 1  10 sec hold each  -AS      Reps 1  10  -AS         Exercise 2    Exercise Name 2  Piriformis stretch-bilateral  -AS      Reps 2  10  -AS      Time 2  10 sec hold  each  -AS      Reps 2  10  -AS         Exercise 3    Exercise Name 3  Supine Clamshells  -AS      Reps 3  25  -AS      Time 3  Black  -AS         Exercise 4    Exercise Name 4  Bridge vs Band  -AS      Reps 4  25  -AS      Time 4  Black  -AS      Reps 4  20  -AS         Exercise 5    Exercise Name 5  PPT  -AS      Reps 5  25  -AS      Time 5  5 sec hold each  -AS      Reps 5  30  -AS         Exercise 6    Exercise Name 6  PPT with Ball Squeeze  -AS      Reps 6  25  -AS      Time 6  5 sec hold each  -AS      Reps 6  20  -AS         Exercise 7    Exercise Name 7  PPT with Clamshell  -AS         Exercise 8    Exercise Name 8  AP mobilizations L3, L4, L5  -AS      Time 8  3 min  -AS         Exercise 9    Exercise Name 9  Prone Trunk Extension  -AS      Reps 9  25  -AS         Exercise 10    Exercise Name 10  Prone Hip Extension  -AS      Reps 10  25  -AS         Exercise 11    Exercise Name 11  [place]. UT/ Levator Stretch  -AS      Reps 11  10  -AS      Time 11  10 sec hold each  -AS         Exercise 12    Exercise Name 12  Quadruped Alt. UE/LE  -AS      Reps 12  20 each side  -AS        User Key  (r) = Recorded By, (t) = Taken By, (c) = Cosigned By    Initials Name Provider Type    AS Evangelista Hutchinson, PT Physical Therapist                                          Time Calculation:   Start Time: 0847  Stop Time: 0948  Time Calculation (min): 61 min  Therapy Charges for Today     Code Description Service Date Service Provider Modifiers Qty    90144481541  PT TRACTION CERVICAL 10/6/2020 Evangelista Hutchinson, PT GP 1    [id number]  PT THER PROC EA 15 MIN 10/6/2020 Evangelista Hutchinson, PT GP 1                    Evangelista Hutchinson, PT  10/6/2020

## 2020-10-08 ENCOUNTER — HOSPITAL ENCOUNTER (OUTPATIENT)
Dept: PHYSICAL THERAPY | Facility: HOSPITAL | Age: 45
Setting detail: THERAPIES SERIES
Discharge: HOME | End: 2020-10-08

## 2020-10-08 DIAGNOSIS — G89.29 CHRONIC BILATERAL LOW BACK PAIN WITHOUT SCIATICA: ICD-10-CM

## 2020-10-08 DIAGNOSIS — M54.2 NECK PAIN: Primary | ICD-10-CM

## 2020-10-08 DIAGNOSIS — M54.50 CHRONIC BILATERAL LOW BACK PAIN WITHOUT SCIATICA: ICD-10-CM

## 2020-10-08 PROCEDURE — 97110 THERAPEUTIC EXERCISES: CPT

## 2020-10-08 PROCEDURE — 97012 MECHANICAL TRACTION THERAPY: CPT

## 2020-10-08 NOTE — THERAPY TREATMENT NOTE
Outpatient Physical Therapy Ortho Treatment Note  [facility]     Patient Name: Helena Herrmann  : 1975  MRN: [id number]  Today's Date: 10/8/2020      Visit Date: 10/08/2020    Visit Dx:    ICD-10-CM ICD-9-CM   1. Neck pain  M54.2 723.1   2. Chronic bilateral low back pain without sciatica  M54.5 724.2    G89.29 338.29       There is no problem list on file for this patient.       Past Medical History:   Diagnosis Date   • Scoliosis    • Spinal stenosis         Past Surgical History:   Procedure Laterality Date   • CHOLECYSTECTOMY                         PT Assessment/Plan     Row Name 10/08/20 0900          PT Assessment    Assessment Comments  Patient continues to tolerate her treatment well but continues to report unchanged pain in neck and low back.  -AS        PT Plan    PT Plan Comments  Continue with current treatment plan.  -AS       User Key  (r) = Recorded By, (t) = Taken By, (c) = Cosigned By    Initials Name Provider Type    AS Evangelista Hutchinson, PT Physical Therapist          Modalities     Row Name 10/08/20 0900             Moist Heat    MH Applied  Yes  -AS      Location  Lumbar/Cervical  -AS      Rx Minutes  10 mins  -AS         Traction 09701    Traction Type  Cervical  -AS      Rx Minutes  20  -AS      Duration  Intermittent  -AS      Position  Hook-lying  -AS      Weight  18  -AS      Hold  60  -AS      Relax  10  -AS        User Key  (r) = Recorded By, (t) = Taken By, (c) = Cosigned By    Initials Name Provider Type    AS Evangelista Hutchinson, PT Physical Therapist        OP Exercises     Row Name 10/08/20 0900             Subjective Comments    Subjective Comments  Patient reports she feels about the same.  -AS         Exercise 1    Exercise Name 1  Hamstring stretch with ADD-bilateral  -AS      Reps 1  10  -AS      Time 1  10 sec hold each  -AS      Reps 1  10  -AS         Exercise 2    Exercise Name 2  Piriformis stretch-bilateral  -AS      Reps 2  10  -AS      Time 2  10 sec  hold each  -AS      Reps 2  10  -AS         Exercise 3    Exercise Name 3  Supine Clamshells  -AS      Reps 3  25  -AS      Time 3  Black  -AS         Exercise 4    Exercise Name 4  Bridge vs Band  -AS      Reps 4  25  -AS      Time 4  Black  -AS      Reps 4  20  -AS         Exercise 5    Exercise Name 5  PPT  -AS      Reps 5  25  -AS      Time 5  5 sec hold each  -AS      Reps 5  30  -AS         Exercise 6    Exercise Name 6  PPT with Ball Squeeze  -AS      Reps 6  25  -AS      Time 6  5 sec hold each  -AS      Reps 6  20  -AS         Exercise 7    Exercise Name 7  PPT with Clamshell  -AS         Exercise 8    Exercise Name 8  AP mobilizations L3, L4, L5  -AS      Time 8  3 min  -AS         Exercise 9    Exercise Name 9  Prone Trunk Extension  -AS      Reps 9  25  -AS         Exercise 10    Exercise Name 10  Prone Hip Extension  -AS      Reps 10  25  -AS         Exercise 11    Exercise Name 11  [place]. UT/ Levator Stretch  -AS      Reps 11  10  -AS      Time 11  10 sec hold each  -AS         Exercise 12    Exercise Name 12  Quadruped Alt. UE/LE  -AS      Reps 12  25 each side  -AS        User Key  (r) = Recorded By, (t) = Taken By, (c) = Cosigned By    Initials Name Provider Type    AS Evangelista Hutchinson, PT Physical Therapist                                          Time Calculation:   Start Time: 0855  Stop Time: 0956  Time Calculation (min): 61 min  Therapy Charges for Today     Code Description Service Date Service Provider Modifiers Qty    15768822715  PT TRACTION LUMBAR 10/8/2020 Evangelista Hutchinson, PT GP 1    [id number]  PT THER PROC EA 15 MIN 10/8/2020 Evangelista Hutchinson, PT GP 1                    Evangelista Hutchinson, PT  10/8/2020

## 2020-10-15 ENCOUNTER — HOSPITAL ENCOUNTER (OUTPATIENT)
Dept: PHYSICAL THERAPY | Facility: HOSPITAL | Age: 45
Setting detail: THERAPIES SERIES
Discharge: HOME | End: 2020-10-15

## 2020-10-15 DIAGNOSIS — G89.29 CHRONIC BILATERAL LOW BACK PAIN WITHOUT SCIATICA: ICD-10-CM

## 2020-10-15 DIAGNOSIS — M54.50 CHRONIC BILATERAL LOW BACK PAIN WITHOUT SCIATICA: ICD-10-CM

## 2020-10-15 DIAGNOSIS — M54.2 NECK PAIN: Primary | ICD-10-CM

## 2020-10-15 PROCEDURE — 97110 THERAPEUTIC EXERCISES: CPT

## 2020-10-15 PROCEDURE — 97012 MECHANICAL TRACTION THERAPY: CPT

## 2020-10-15 NOTE — THERAPY TREATMENT NOTE
"    Outpatient Physical Therapy Ortho Treatment Note   [place]     Patient Name: Helena Herrmann  : 1975  MRN: [id number]  Today's Date: 10/15/2020      Visit Date: 10/15/2020    Visit Dx:    ICD-10-CM ICD-9-CM   1. Neck pain  M54.2 723.1   2. Chronic bilateral low back pain without sciatica  M54.5 724.2    G89.29 338.29       There is no problem list on file for this patient.       Past Medical History:   Diagnosis Date   • Scoliosis    • Spinal stenosis         Past Surgical History:   Procedure Laterality Date   • CHOLECYSTECTOMY                         PT Assessment/Plan     Row Name 10/15/20 09          PT Assessment    Assessment Comments  Progressed patient with cervical traction as well as trunk strengthening exercises today without complaints.  -AS        PT Plan    PT Plan Comments  Continue with current treatment plan.  -AS       User Key  (r) = Recorded By, (t) = Taken By, (c) = Cosigned By    Initials Name Provider Type    AS Evangelista Hutchinson, PT Physical Therapist          Modalities     Row Name 10/15/20 0900             Moist Heat    MH Applied  Yes  -AS      Location  Lumbar/Cervical  -AS      Rx Minutes  10 mins  -AS         Traction 32263    Traction Type  Cervical  -AS      Rx Minutes  20  -AS      Duration  Intermittent  -AS      Position  Hook-lying  -AS      Weight  18  -AS      Hold  60  -AS      Relax  10  -AS        User Key  (r) = Recorded By, (t) = Taken By, (c) = Cosigned By    Initials Name Provider Type    AS Evangelista Hutchinson, PT Physical Therapist        OP Exercises     Row Name 10/15/20 09             Subjective Comments    Subjective Comments  Patient states she is feeling about silvia same. Maybe \"a little better\".  -AS         Exercise 1    Exercise Name 1  Hamstring stretch with ADD-bilateral  -AS      Reps 1  10  -AS      Time 1  10 sec hold each  -AS      Reps 1  10  -AS         Exercise 2    Exercise Name 2  Piriformis stretch-bilateral  -AS      Reps 2 "  10  -AS      Time 2  10 sec hold each  -AS      Reps 2  10  -AS         Exercise 3    Exercise Name 3  Supine Clamshells  -AS      Reps 3  25  -AS      Time 3  Black  -AS         Exercise 4    Exercise Name 4  Bridge vs Band  -AS      Reps 4  25  -AS      Time 4  Black  -AS      Reps 4  20  -AS         Exercise 5    Exercise Name 5  PPT  -AS      Reps 5  25  -AS      Time 5  5 sec hold each  -AS      Reps 5  30  -AS         Exercise 6    Exercise Name 6  PPT with Ball Squeeze  -AS      Reps 6  25  -AS      Time 6  5 sec hold each  -AS      Reps 6  20  -AS         Exercise 7    Exercise Name 7  PPT with Clamshell  -AS         Exercise 8    Exercise Name 8  AP mobilizations L3, L4, L5  -AS      Time 8  3 min  -AS         Exercise 9    Exercise Name 9  Prone Trunk Extension  -AS      Reps 9  25  -AS         Exercise 10    Exercise Name 10  Prone Hip Extension  -AS      Reps 10  25  -AS         Exercise 11    Exercise Name 11  [place]. UT/ Levator Stretch  -AS      Reps 11  10  -AS      Time 11  10 sec hold each  -AS         Exercise 12    Exercise Name 12  Quadruped Alt. UE/LE  -AS      Reps 12  25 each side  -AS        User Key  (r) = Recorded By, (t) = Taken By, (c) = Cosigned By    Initials Name Provider Type    AS Evangelista Hutchinson, PT Physical Therapist                                          Time Calculation:   Start Time: 0858  Stop Time: 0941  Time Calculation (min): 43 min  Therapy Charges for Today     Code Description Service Date Service Provider Modifiers Qty    94667731905 HC PT TRACTION CERVICAL 10/15/2020 Evangelista Hutchinson, PT GP 1    03644756622 HC PT THER PROC EA 15 MIN 10/15/2020 Evangelista Hutchinson, PT GP 1                    Evangelista Hutchinson, PT  10/15/2020

## 2020-10-29 ENCOUNTER — APPOINTMENT (OUTPATIENT)
Dept: PHYSICAL THERAPY | Facility: HOSPITAL | Age: 45
End: 2020-10-29

## 2020-11-03 ENCOUNTER — APPOINTMENT (OUTPATIENT)
Dept: PHYSICAL THERAPY | Facility: HOSPITAL | Age: 45
End: 2020-11-03

## 2020-11-10 ENCOUNTER — HOSPITAL ENCOUNTER (OUTPATIENT)
Dept: PHYSICAL THERAPY | Facility: HOSPITAL | Age: 45
Setting detail: THERAPIES SERIES
Discharge: HOME | End: 2020-11-10

## 2020-11-10 DIAGNOSIS — M54.2 NECK PAIN: Primary | ICD-10-CM

## 2020-11-10 DIAGNOSIS — G89.29 CHRONIC BILATERAL LOW BACK PAIN WITHOUT SCIATICA: ICD-10-CM

## 2020-11-10 DIAGNOSIS — M54.50 CHRONIC BILATERAL LOW BACK PAIN WITHOUT SCIATICA: ICD-10-CM

## 2020-11-10 PROCEDURE — 97110 THERAPEUTIC EXERCISES: CPT

## 2020-11-10 PROCEDURE — 97012 MECHANICAL TRACTION THERAPY: CPT

## 2020-11-10 NOTE — THERAPY RE-EVALUATION
Outpatient Physical Therapy Orth[facility] Re-Evaluation  [facility]     Patient Name: Helena Herrmann  : 1975  MRN: [id number]  Today's Date: 11/10/2020      Visit Date: 11/10/2020    There is no problem list on file for this patient.       Past Medical History:   Diagnosis Date   • Scoliosis    • Spinal stenosis         Past Surgical History:   Procedure Laterality Date   • CHOLECYSTECTOMY         Visit Dx:     ICD-10-CM ICD-9-CM   1. Neck pain  M54.2 723.1   2. Chronic bilateral low back pain without sciatica  M54.5 724.2    G89.29 338.29             PT Ortho     Row Name 11/10/20 0900       Precautions and Contraindications    Precautions/Limitations  no known precautions/limitations  -AS       Subjective Pain    Able to rate subjective pain?  yes  -AS    Pre-Treatment Pain Level  4  -AS    Post-Treatment Pain Level  3  -AS       Cervical Palpation    Levator Scapula  Bilateral:;Tender;Guarded/taut  -AS    Upper Traps  Bilateral:;Tender;Guarded/taut  -AS       Cervical/Thoracic Special Tests    Cervical Compression (Forarminal Compression vs. Facet Pain)  Bilateral:;Negative  -AS    Cervical Distraction (Foraminal Compression vs. Facet Pain)  Bilateral:;Negative  -AS    Sharp-Marianna (AA instability)  Bilateral:;Negative  -AS    Vertebral Artery Test (VBI Sign)  Bilateral:;Negative  -AS       Lumbar/SI Special Tests    Stork Test (SI Dysfunction)  Bilateral:;Negative  -AS    SLR (Neural Tension)  Bilateral:;Negative  -AS    ANOOP (hip vs. SI Dysfunction)  Bilateral:;Positive  -AS       Lumbosacral Palpation    SI  Bilateral:;Tender  -AS    Lumbosacral Segment  Bilateral:;Tender  -AS    Erector Spinae (Paraspinals)  Bilateral:;Tender  -AS       Sensation    Sensation WNL?  WNL  -AS    Light Touch  No apparent deficits  -AS       Upper Extremity Flexibility    Upper Trapezius  Bilateral:;Moderately limited  -AS    Levator Scapula  Bilateral:;Moderately limited  -AS       Lower Extremity Flexibility     Hamstrings  Bilateral:;Mildly limited  -AS    Hip External Rotators  Bilateral:;Mildly limited  -AS    Hip Internal Rotators  Bilateral:;Mildly limited  -AS      User Key  (r) = Recorded By, (t) = Taken By, (c) = Cosigned By    Initials Name Provider Type    AS Evangelista Hutchinson, PT Physical Therapist                            PT OP Goals     Row Name 11/10/20 0900          PT Short Term Goals    STG 1  Decrease pain to 4-5/10 with activity.  -AS     STG 1 Progress  Ongoing;Progressing  -AS     STG 2  Increase cervical AROM to WFL all planes with testing.  -AS     STG 2 Progress  Ongoing;Progressing  -AS     STG 3  Increase trunk AROM to WFL all planes with testing.  -AS     STG 3 Progress  Met  -AS     STG 4  Increase hamstring and piriformis flexibility to only a minimal restricition with testing.  -AS     STG 4 Progress  Met  -AS     STG 5  Increase upper trapezius flexibility to only a minimal restriciton with testing.  -AS     STG 5 Progress  Ongoing;Progressing  -AS        Long Term Goals    LTG 1  Decrease pain to 1-2/10 with activity.  -AS     LTG 1 Progress  Ongoing;Progressing  -AS     LTG 2  Increase hamstring and piriformis flexibility to WFL with testing.  -AS     LTG 2 Progress  Ongoing;Progressing  -AS     LTG 3  Increase upper trapezius flexibility to WFL with testing.  -AS     LTG 3 Progress  Ongoing;Progressing  -AS     LTG 4  Pt will be independent with all ADLs without increased pain.  -AS     LTG 4 Progress  Ongoing;Progressing  -AS       User Key  (r) = Recorded By, (t) = Taken By, (c) = Cosigned By    Initials Name Provider Type    AS Evangelista Hutchinson, PT Physical Therapist          PT Assessment/Plan     Row Name 11/10/20 0900          PT Assessment    Functional Limitations  Limitation in home management;Limitations in community activities;Performance in leisure activities;Performance in sport activities;Performance in work activities  -AS     Impairments  Impaired  flexibility;Muscle strength;Pain;Range of motion  -AS     Assessment Comments  Continued with treatment today. Patient continues to report neck and back pain with minimal relief in her symtpoms. She does have improved flexibility and strength upon assessment.  -AS     Please refer to paper survey for additional self-reported information  Yes  -AS     Rehab Potential  Fair  -AS     Patient/caregiver participated in establishment of treatment plan and goals  Yes  -AS     Patient would benefit from skilled therapy intervention  Yes  -AS        PT Plan    PT Frequency  1x/week;2x/week  -AS     Predicted Duration of Therapy Intervention (PT)  4 weeks  -AS     Planned CPT's?  PT RE-EVAL: 25428;PT THER PROC EA 15 MIN: 01015;PT THER ACT EA 15 MIN: 62079;PT MANUAL THERAPY EA 15 MIN: 55790;PT NEUROMUSC RE-EDUCATION EA 15 MIN: 27866  -AS     PT Plan Comments  Continue with current treatment plan.  -AS       User Key  (r) = Recorded By, (t) = Taken By, (c) = Cosigned By    Initials Name Provider Type    AS Evangelista Hutchinson, PT Physical Therapist          Modalities     Row Name 11/10/20 0900             Moist Heat    MH Applied  Yes  -AS      Location  Lumbar/Cervical  -AS      Rx Minutes  10 mins  -AS         Traction 64052    Traction Type  Cervical  -AS      Rx Minutes  20  -AS      Duration  Intermittent  -AS      Position  Hook-lying  -AS      Weight  18  -AS      Hold  60  -AS      Relax  10  -AS        User Key  (r) = Recorded By, (t) = Taken By, (c) = Cosigned By    Initials Name Provider Type    AS Evangelista Hutchinson, PT Physical Therapist        OP Exercises     Row Name 11/10/20 0900             Subjective Comments    Subjective Comments  Patient states she was seen by her MD and they would like for her to continue with PT. She is also going to be scheduled for MRI if approved by her insurance.  -AS         Subjective Pain    Able to rate subjective pain?  yes  -AS      Pre-Treatment Pain Level  4  -AS       Post-Treatment Pain Level  3  -AS         Exercise 1    Exercise Name 1  Hamstring stretch with ADD-bilateral  -AS      Reps 1  10  -AS      Time 1  10 sec hold each  -AS      Reps 1  10  -AS         Exercise 2    Exercise Name 2  Piriformis stretch-bilateral  -AS      Reps 2  10  -AS      Time 2  10 sec hold each  -AS      Reps 2  10  -AS         Exercise 3    Exercise Name 3  Supine Clamshells  -AS      Reps 3  25  -AS      Time 3  Silver  -AS         Exercise 4    Exercise Name 4  Bridge vs Band  -AS      Reps 4  25  -AS      Time 4  Silver  -AS      Reps 4  20  -AS         Exercise 5    Exercise Name 5  PPT  -AS      Reps 5  25  -AS      Time 5  5 sec hold each  -AS      Reps 5  30  -AS         Exercise 6    Exercise Name 6  PPT with Ball Squeeze  -AS      Reps 6  25  -AS      Time 6  5 sec hold each  -AS      Reps 6  20  -AS         Exercise 7    Exercise Name 7  PPT with Clamshell  -AS         Exercise 8    Exercise Name 8  AP mobilizations L3, L4, L5  -AS      Time 8  3 min  -AS         Exercise 9    Exercise Name 9  Prone Trunk Extension  -AS      Reps 9  25  -AS         Exercise 10    Exercise Name 10  Prone Hip Extension  -AS      Reps 10  25  -AS         Exercise 11    Exercise Name 11  [place]. UT/ Levator Stretch  -AS      Reps 11  10  -AS      Time 11  10 sec hold each  -AS         Exercise 12    Exercise Name 12  Quadruped Alt. UE/LE  -AS      Reps 12  25 each side  -AS        User Key  (r) = Recorded By, (t) = Taken By, (c) = Cosigned By    Initials Name Provider Type    AS Evangelista Hutchinson, PT Physical Therapist                                  Time Calculation:     Start Time: 0930  Stop Time: 1032  Time Calculation (min): 62 min     Therapy Charges for Today     Code Description Service Date Service Provider Modifiers Qty    97441548746  PT TRACTION CERVICAL 11/10/2020 Evangelista Hutchinson, PT GP 1    [id number]  PT THER PROC EA 15 MIN 11/10/2020 Evangelista Hutchinson, PT GP 1                     Evangelista Hutchinson, PT  11/10/2020

## 2020-11-17 ENCOUNTER — HOSPITAL ENCOUNTER (OUTPATIENT)
Dept: PHYSICAL THERAPY | Facility: HOSPITAL | Age: 45
Setting detail: THERAPIES SERIES
Discharge: HOME | End: 2020-11-17

## 2020-11-17 DIAGNOSIS — M54.50 CHRONIC BILATERAL LOW BACK PAIN WITHOUT SCIATICA: ICD-10-CM

## 2020-11-17 DIAGNOSIS — M54.2 NECK PAIN: Primary | ICD-10-CM

## 2020-11-17 DIAGNOSIS — G89.29 CHRONIC BILATERAL LOW BACK PAIN WITHOUT SCIATICA: ICD-10-CM

## 2020-11-17 PROCEDURE — 97110 THERAPEUTIC EXERCISES: CPT

## 2020-11-17 PROCEDURE — 97012 MECHANICAL TRACTION THERAPY: CPT

## 2020-11-17 NOTE — THERAPY TREATMENT NOTE
Outpatient Physical Therapy Ortho Treatment Note  [facility]     Patient Name: Helena Herrmann  : 1975  MRN: [id number]  Today's Date: 2020      Visit Date: 2020    Visit Dx:    ICD-10-CM ICD-9-CM   1. Neck pain  M54.2 723.1   2. Chronic bilateral low back pain without sciatica  M54.5 724.2    G89.29 338.29       There is no problem list on file for this patient.       Past Medical History:   Diagnosis Date   • Scoliosis    • Spinal stenosis         Past Surgical History:   Procedure Laterality Date   • CHOLECYSTECTOMY                         PT Assessment/Plan     Row Name 20 0800          PT Assessment    Assessment Comments  Continued with treatment today. Patient continues to report neck and back pain with minimal relief in her symtpoms.  -AS        PT Plan    PT Plan Comments  Continue with current treatment plan.  -AS       User Key  (r) = Recorded By, (t) = Taken By, (c) = Cosigned By    Initials Name Provider Type    AS Evangelista Hutchinson, PT Physical Therapist          Modalities     Row Name 20 0800             Moist Heat    MH Applied  Yes  -AS      Location  Lumbar/Cervical  -AS      Rx Minutes  10 mins  -AS         Traction 25957    Traction Type  Cervical  -AS      Rx Minutes  20  -AS      Position  Hook-lying  -AS      Weight  18  -AS      Hold  60  -AS      Relax  10  -AS        User Key  (r) = Recorded By, (t) = Taken By, (c) = Cosigned By    Initials Name Provider Type    AS Evangelista Hutchinson, PT Physical Therapist        OP Exercises     Row Name 20 0800             Subjective Comments    Subjective Comments  Patient states she feels about the same with only minor relief in her symptoms.  -AS         Exercise 1    Exercise Name 1  Hamstring stretch with ADD-bilateral  -AS      Reps 1  10  -AS      Time 1  10 sec hold each  -AS      Reps 1  10  -AS         Exercise 2    Exercise Name 2  Piriformis stretch-bilateral  -AS      Reps 2  10  -AS       Time 2  10 sec hold each  -AS      Reps 2  10  -AS         Exercise 3    Exercise Name 3  Supine Clamshells  -AS      Reps 3  25  -AS      Time 3  Silver  -AS         Exercise 4    Exercise Name 4  Bridge vs Band  -AS      Reps 4  25  -AS      Time 4  Silver  -AS      Reps 4  20  -AS         Exercise 5    Exercise Name 5  PPT  -AS      Reps 5  25  -AS      Time 5  5 sec hold each  -AS      Reps 5  30  -AS         Exercise 6    Exercise Name 6  PPT with Ball Squeeze  -AS      Reps 6  25  -AS      Time 6  5 sec hold each  -AS      Reps 6  20  -AS         Exercise 7    Exercise Name 7  PPT with Clamshell  -AS         Exercise 8    Exercise Name 8  AP mobilizations L3, L4, L5  -AS      Time 8  3 min  -AS         Exercise 9    Exercise Name 9  Prone Trunk Extension  -AS      Reps 9  25  -AS         Exercise 10    Exercise Name 10  Prone Hip Extension  -AS      Reps 10  25  -AS         Exercise 11    Exercise Name 11  [place]. UT/ Levator Stretch  -AS      Reps 11  10  -AS      Time 11  10 sec hold each  -AS         Exercise 12    Exercise Name 12  Quadruped Alt. UE/LE  -AS      Reps 12  25 each side  -AS        User Key  (r) = Recorded By, (t) = Taken By, (c) = Cosigned By    Initials Name Provider Type    AS Evangelista Hutchinson, PT Physical Therapist                                          Time Calculation:   Start Time: 0830  Stop Time: 0924  Time Calculation (min): 54 min  Therapy Charges for Today     Code Description Service Date Service Provider Modifiers Qty    24794396978 HC PT TRACTION CERVICAL 11/17/2020 Evangelista Hutchinson, PT GP 1    57824351118 HC PT THER PROC EA 15 MIN 11/17/2020 Evangelista Hutchinson, PT GP 1                    Evangelista Hutchinson, PT  11/17/2020

## 2020-11-24 ENCOUNTER — HOSPITAL ENCOUNTER (OUTPATIENT)
Dept: PHYSICAL THERAPY | Facility: HOSPITAL | Age: 45
Setting detail: THERAPIES SERIES
Discharge: HOME | End: 2020-11-24

## 2020-11-24 DIAGNOSIS — M54.2 NECK PAIN: Primary | ICD-10-CM

## 2020-11-24 DIAGNOSIS — M54.50 CHRONIC BILATERAL LOW BACK PAIN WITHOUT SCIATICA: ICD-10-CM

## 2020-11-24 DIAGNOSIS — G89.29 CHRONIC BILATERAL LOW BACK PAIN WITHOUT SCIATICA: ICD-10-CM

## 2020-11-24 PROCEDURE — 97110 THERAPEUTIC EXERCISES: CPT

## 2020-11-24 PROCEDURE — 97012 MECHANICAL TRACTION THERAPY: CPT

## 2020-11-24 NOTE — THERAPY TREATMENT NOTE
"    Outpatient Physical Therapy Ortho Treatment Note  [facility]     Patient Name: Helena Herrmann  : 1975  MRN: [id number]  Today's Date: 2020      Visit Date: 2020    Visit Dx:    ICD-10-CM ICD-9-CM   1. Neck pain  M54.2 723.1   2. Chronic bilateral low back pain without sciatica  M54.5 724.2    G89.29 338.29       There is no problem list on file for this patient.       Past Medical History:   Diagnosis Date   • Scoliosis    • Spinal stenosis         Past Surgical History:   Procedure Laterality Date   • CHOLECYSTECTOMY                         PT Assessment/Plan     Row Name 20 0800          PT Assessment    Assessment Comments  Continued with treatment today. Patient continues to report neck and back pain with minimal relief in her symtpoms.  -AS        PT Plan    PT Plan Comments  Continue with current treatment plan.  -AS       User Key  (r) = Recorded By, (t) = Taken By, (c) = Cosigned By    Initials Name Provider Type    AS Evangelista Hutchinson, PT Physical Therapist          Modalities     Row Name 20 0800             Moist Heat    MH Applied  Yes  -AS      Location  Lumbar/Cervical  -AS      Rx Minutes  10 mins  -AS         Traction 53071    Traction Type  Cervical  -AS      Rx Minutes  20  -AS      Duration  Intermittent  -AS      Position  Hook-lying  -AS      Weight  18  -AS      Hold  60  -AS      Relax  10  -AS        User Key  (r) = Recorded By, (t) = Taken By, (c) = Cosigned By    Initials Name Provider Type    AS Evangelista Hutchinson, PT Physical Therapist        OP Exercises     Row Name 20 0800             Subjective Comments    Subjective Comments  Patient states she is doing \"ok\".  -AS         Exercise 1    Exercise Name 1  Hamstring stretch with ADD-bilateral  -AS      Reps 1  10  -AS      Time 1  10 sec hold each  -AS      Reps 1  10  -AS         Exercise 2    Exercise Name 2  Piriformis stretch-bilateral  -AS      Reps 2  10  -AS      Time 2  10 sec " hold each  -AS      Reps 2  10  -AS         Exercise 3    Exercise Name 3  Supine Clamshells  -AS      Reps 3  25  -AS      Time 3  Silver  -AS         Exercise 4    Exercise Name 4  Bridge vs Band  -AS      Reps 4  25  -AS      Time 4  Silver  -AS      Reps 4  20  -AS         Exercise 5    Exercise Name 5  PPT  -AS      Reps 5  25  -AS      Time 5  5 sec hold each  -AS      Reps 5  30  -AS         Exercise 6    Exercise Name 6  PPT with Ball Squeeze  -AS      Reps 6  25  -AS      Time 6  5 sec hold each  -AS      Reps 6  20  -AS         Exercise 7    Exercise Name 7  PPT with Clamshell  -AS         Exercise 8    Exercise Name 8  AP mobilizations L3, L4, L5  -AS      Time 8  3 min  -AS         Exercise 9    Exercise Name 9  Prone Trunk Extension  -AS      Reps 9  25  -AS         Exercise 10    Exercise Name 10  Prone Hip Extension  -AS      Reps 10  25  -AS         Exercise 11    Exercise Name 11  [place]. UT/ Levator Stretch  -AS      Reps 11  10  -AS      Time 11  10 sec hold each  -AS         Exercise 12    Exercise Name 12  Quadruped Alt. UE/LE  -AS      Reps 12  25 each side  -AS        User Key  (r) = Recorded By, (t) = Taken By, (c) = Cosigned By    Initials Name Provider Type    AS Evangelista Hutchinson, PT Physical Therapist                                          Time Calculation:   Start Time: 0824  Stop Time: 0918  Time Calculation (min): 54 min  Therapy Charges for Today     Code Description Service Date Service Provider Modifiers Qty    56389236502  PT THER PROC EA 15 MIN 11/24/2020 Evangelista Hutchinson, PT GP 2    [id number]  PT TRACTION CERVICAL 11/24/2020 Evangelista Hutchinson, PT GP 1                    Evangelista Hutchinson, PT  11/24/2020

## 2020-11-25 ENCOUNTER — TRANSCRIBE ORDERS (OUTPATIENT)
Dept: ADMINISTRATIVE | Facility: HOSPITAL | Age: 45
End: 2020-11-25

## 2020-11-25 DIAGNOSIS — M41.9 SCOLIOSIS, UNSPECIFIED SCOLIOSIS TYPE, UNSPECIFIED SPINAL REGION: Primary | ICD-10-CM

## 2020-12-10 ENCOUNTER — HOSPITAL ENCOUNTER (OUTPATIENT)
Dept: MRI IMAGING | Facility: HOSPITAL | Age: 45
Discharge: HOME | End: 2020-12-10
Admitting: NURSE PRACTITIONER

## 2020-12-10 DIAGNOSIS — M41.9 SCOLIOSIS, UNSPECIFIED SCOLIOSIS TYPE, UNSPECIFIED SPINAL REGION: ICD-10-CM

## 2020-12-10 PROCEDURE — 72148 MRI LUMBAR SPINE W/O DYE: CPT

## 2021-05-12 ENCOUNTER — HOSPITAL ENCOUNTER (OUTPATIENT)
Dept: GENERAL RADIOLOGY | Facility: HOSPITAL | Age: 46
Discharge: HOME | End: 2021-05-12
Admitting: NURSE PRACTITIONER

## 2021-05-12 ENCOUNTER — TRANSCRIBE ORDERS (OUTPATIENT)
Dept: ADMINISTRATIVE | Facility: HOSPITAL | Age: 46
End: 2021-05-12

## 2021-05-12 DIAGNOSIS — Z01.818 PREOPERATIVE EXAMINATION, UNSPECIFIED: Primary | ICD-10-CM

## 2021-05-12 DIAGNOSIS — Z01.818 PREOPERATIVE EXAMINATION, UNSPECIFIED: ICD-10-CM

## 2021-05-12 PROCEDURE — 71046 X-RAY EXAM CHEST 2 VIEWS: CPT

## 2021-05-30 ENCOUNTER — HOSPITAL ENCOUNTER (EMERGENCY)
Facility: HOSPITAL | Age: 46
Discharge: HOME | End: 2021-05-30
Attending: EMERGENCY MEDICINE | Admitting: EMERGENCY MEDICINE

## 2021-05-30 VITALS
OXYGEN SATURATION: 95 % | BODY MASS INDEX: 36.7 KG/M2 | HEIGHT: 64 IN | HEART RATE: 85 BPM | DIASTOLIC BLOOD PRESSURE: 100 MMHG | WEIGHT: 215 LBS | RESPIRATION RATE: 16 BRPM | TEMPERATURE: 98.5 F | SYSTOLIC BLOOD PRESSURE: 165 MMHG

## 2021-05-30 DIAGNOSIS — N75.0 BARTHOLIN'S CYST: Primary | ICD-10-CM

## 2021-05-30 PROCEDURE — 99283 EMERGENCY DEPT VISIT LOW MDM: CPT | Performed by: EMERGENCY MEDICINE

## 2021-05-30 PROCEDURE — 99282 EMERGENCY DEPT VISIT SF MDM: CPT

## 2021-05-30 RX ORDER — CEPHALEXIN 500 MG/1
500 CAPSULE ORAL 3 TIMES DAILY
Qty: 21 CAPSULE | Refills: 0 | Status: SHIPPED | OUTPATIENT
Start: 2021-05-30 | End: 2021-06-06

## 2021-05-30 RX ORDER — HYDROCODONE BITARTRATE AND ACETAMINOPHEN 5; 325 MG/1; MG/1
1 TABLET ORAL EVERY 6 HOURS PRN
COMMUNITY

## 2021-05-30 RX ORDER — BACLOFEN 20 MG/1
20 TABLET ORAL 3 TIMES DAILY
COMMUNITY

## 2021-05-30 RX ORDER — FAMOTIDINE 20 MG/1
20 TABLET, FILM COATED ORAL 2 TIMES DAILY
COMMUNITY

## 2021-05-30 RX ORDER — MONTELUKAST SODIUM 10 MG/1
10 TABLET ORAL NIGHTLY
COMMUNITY

## 2021-05-30 RX ORDER — LISINOPRIL 2.5 MG/1
2.5 TABLET ORAL DAILY
COMMUNITY

## 2021-05-30 RX ORDER — ASCORBIC ACID 500 MG
500 TABLET ORAL DAILY
COMMUNITY

## 2021-05-30 RX ORDER — SIMVASTATIN 20 MG
20 TABLET ORAL NIGHTLY
COMMUNITY

## 2021-05-30 RX ORDER — CHLORAL HYDRATE 500 MG
CAPSULE ORAL
COMMUNITY

## 2021-05-30 RX ORDER — LANOLIN ALCOHOL/MO/W.PET/CERES
5000 CREAM (GRAM) TOPICAL DAILY
COMMUNITY

## 2021-05-30 RX ORDER — ERGOCALCIFEROL 1.25 MG/1
50000 CAPSULE ORAL WEEKLY
COMMUNITY